# Patient Record
Sex: MALE | Race: WHITE | NOT HISPANIC OR LATINO
[De-identification: names, ages, dates, MRNs, and addresses within clinical notes are randomized per-mention and may not be internally consistent; named-entity substitution may affect disease eponyms.]

---

## 2021-01-28 PROBLEM — Z00.00 ENCOUNTER FOR PREVENTIVE HEALTH EXAMINATION: Status: ACTIVE | Noted: 2021-01-28

## 2021-01-29 ENCOUNTER — APPOINTMENT (OUTPATIENT)
Dept: ORTHOPEDIC SURGERY | Facility: CLINIC | Age: 36
End: 2021-01-29
Payer: COMMERCIAL

## 2021-01-29 VITALS — WEIGHT: 185 LBS | BODY MASS INDEX: 25.9 KG/M2 | HEIGHT: 71 IN

## 2021-01-29 DIAGNOSIS — M76.51 PATELLAR TENDINITIS, RIGHT KNEE: ICD-10-CM

## 2021-01-29 DIAGNOSIS — Z80.9 FAMILY HISTORY OF MALIGNANT NEOPLASM, UNSPECIFIED: ICD-10-CM

## 2021-01-29 DIAGNOSIS — Z87.891 PERSONAL HISTORY OF NICOTINE DEPENDENCE: ICD-10-CM

## 2021-01-29 DIAGNOSIS — Z87.09 PERSONAL HISTORY OF OTHER DISEASES OF THE RESPIRATORY SYSTEM: ICD-10-CM

## 2021-01-29 PROCEDURE — 99072 ADDL SUPL MATRL&STAF TM PHE: CPT

## 2021-01-29 PROCEDURE — 99204 OFFICE O/P NEW MOD 45 MIN: CPT

## 2021-01-29 RX ORDER — ALBUTEROL SULFATE 90 UG/1
INHALANT RESPIRATORY (INHALATION)
Refills: 0 | Status: ACTIVE | COMMUNITY

## 2021-01-29 RX ORDER — DICLOFENAC SODIUM 1% 10 MG/G
1 GEL TOPICAL
Qty: 1 | Refills: 0 | Status: ACTIVE | COMMUNITY
Start: 2021-01-29 | End: 1900-01-01

## 2021-01-29 RX ORDER — OMEPRAZOLE 20 MG/1
20 TABLET, DELAYED RELEASE ORAL
Refills: 0 | Status: ACTIVE | COMMUNITY

## 2021-01-29 NOTE — DISCUSSION/SUMMARY
[de-identified] : Discussed findings of today's exam and possible causes of patient's pain.  Educated patient on their most probable diagnosis of right insertional patellar tendinitis, exacerbation of prior existing Osgood-schlatter's disease with calcific tendinitis.  Reviewed possible courses of treatment, and we collaboratively decided best course of treatment at this time will include conservative management.  Patient was seen by his PCP for this issue, he was started on Mobic, he is planning on picking up the prescription later today.  I recommend he take this medication once a day with food consistently for the next 1 week, then as needed thereafter.  Patient also started on a course of topical NSAIDs as this issue is a very superficial inflammatory problem, prescription given for diclofenac gel 1% to be applied to the area of pain 2-3 times daily as needed (We discussed all possible side effects of this medication).  Patient will be started on a course of physical therapy to restore normal range of motion and strength as tolerated.  Patient will be excused from his work as an [employer]  for 1 week, he should be able to return to work full duty at that time.  Patient advised he can  an over-the-counter patellar tendon strap to be worn for support during the day with activity and with his return to work.  Follow up as needed.  Patient appreciates and agrees with current plan.\par \par This note was generated using dragon medical dictation software.  A reasonable effort has been made for proofreading its contents, but typos may still remain.  If there are any questions or points of clarification needed please notify my office.

## 2021-01-29 NOTE — RETURN TO WORK/SCHOOL
[FreeTextEntry1] : Ronen was seen today for evaluation and management of right knee pain.  Please excuse him from work until Friday, 2/5/2021 at which time he is cleared for full work duty without restrictions.\par Thank you for your understanding.\par \par Sincerely,\par \par oJse C Baptiste DO, ATC\par Primary Care Sports Medicine\par [facility]\par

## 2021-01-29 NOTE — PHYSICAL EXAM
[de-identified] : Constitutional: Well-nourished, well-developed, No acute distress\par Respiratory:  Good respiratory effort, no SOB\par Lymphatic: No regional lymphadenopathy, no lymphedema\par Psychiatric: Pleasant and normal affect, alert and oriented x3\par Skin: Clean dry and intact B/L UE/LE\par Musculoskeletal: normal except where as noted in regional exam\par \par \par Left knee:\par APPEARANCE: no marked deformities, no swelling or malalignment\par POSITIVE TENDERNESS:  none\par NONTENDER: jt lines b/l & retinacula b/l, patellar & quadriceps tendons, MCL/LCL, ITB at the lateral femoral condyle & Gerdy's tubercle, pes bursa. \par ROM: full & painless. \par RESISTIVE TESTING: painless resisted knee flex/ext. \par SPECIAL TESTS: stable v/v stress. painless grind. neg Lachman's. neg ant/post drawer. neg Tej's. neg Thessaly test. neg Rosy's & Malacrae's\par NEURO: Normal sensation of LE, DTRs 2+/4 patella and achilles\par PULSES: 2+ DP/PT pulses\par \par Right knee:\par APPEARANCE: no marked deformities, no swelling or malalignment\par POSITIVE TENDERNESS:  + crepitus of the anterior knee, and tenderness of patellar tendon\par NONTENDER: jt lines b/l, quadriceps tendons, MCL/LCL, ITB at the lateral femoral condyle & Gerdy's tubercle, pes bursa. \par ROM: full & painless, although some discomfort in deep knee flexion\par RESISTIVE TESTING: + discomfort with knee ext from deep knee flexion (stretched position), painless knee flexion. \par SPECIAL TESTS: stable v/v stress. painless grind. neg Lachman's. neg ant/post drawer. neg Tej's. neg Thessaly test. neg Rosy's & Malacrae's\par NEURO: Normal sensation of LE, DTRs 2+/4 patella and achilles\par PULSES: 2+ DP/PT pulses [de-identified] : I reviewed and clinically correlated the following outside imaging studies,\par My review of the patient's images shows he has prior existing Osgood-Schlatter's disease with a small calcium deposit in the distal patellar tendon\par \par  RIGHT KNEE XRAY AP LATERAL AND OBLIQUE 3 VIEWS\par HISTORY: M25.561 Right knee pain M25.361 Right knee instability\par AP, lateral, and oblique views of the right knee are obtained.\par There is no fracture, subluxation or dislocation. No osteoblastic or osteolytic lesions can be identified.\par The joint spaces are preserved. The subarticular cortex is smooth. No joint effusion is demonstrated.\par IMPRESSION: There are no significant degenerative changes.\par There are no fractures.

## 2021-01-29 NOTE — CONSULT LETTER
[Dear  ___] : Dear  [unfilled], [Consult Letter:] : I had the pleasure of evaluating your patient, [unfilled]. [Please see my note below.] : Please see my note below. [Sincerely,] : Sincerely, [FreeTextEntry2] : [id number], Joan, 4231\par [place] Nina [FreeTextEntry3] : Jose C Baptiste DO, ATC\par Primary Care Sports Medicine\par [facility]

## 2021-01-29 NOTE — HISTORY OF PRESENT ILLNESS
[de-identified] : Patient is here for right knee pain that began on 1/23/21 after using the elliptical. He had increased sharp pain on 1/26/21 after bending his knee. He has ahistory of right sided imbalance. He has been treated with PT. His pain is located anteriorly. He saw his PCP who sent him for xrays which were negative for fracture. He was prescribed Mobic.  He uses a compression wrap. Denies N/T/R/Prior injury. He works in [employer]. \par \par The patient's past medical history, past surgical history, medications and allergies were reviewed by me today and documented accordingly. In addition, the patient's family and social history, which were noncontributory to this visit, were reviewed also. Intake form was reviewed. The patient has no family history of arthritis.

## 2021-07-18 ENCOUNTER — TRANSCRIPTION ENCOUNTER (OUTPATIENT)
Age: 36
End: 2021-07-18

## 2021-07-18 ENCOUNTER — INPATIENT (INPATIENT)
Facility: HOSPITAL | Age: 36
LOS: 0 days | Discharge: HOME | DRG: 343 | End: 2021-07-19
Attending: SURGERY | Admitting: SURGERY
Payer: COMMERCIAL

## 2021-07-18 VITALS
OXYGEN SATURATION: 99 % | WEIGHT: 190.04 LBS | RESPIRATION RATE: 16 BRPM | DIASTOLIC BLOOD PRESSURE: 93 MMHG | TEMPERATURE: 99 F | SYSTOLIC BLOOD PRESSURE: 143 MMHG | HEART RATE: 89 BPM | HEIGHT: 71 IN

## 2021-07-18 LAB
ALBUMIN SERPL ELPH-MCNC: 4.6 G/DL — SIGNIFICANT CHANGE UP (ref 3.3–5)
ALP SERPL-CCNC: 92 U/L — SIGNIFICANT CHANGE UP (ref 40–120)
ALT FLD-CCNC: 37 U/L — SIGNIFICANT CHANGE UP (ref 12–78)
ANION GAP SERPL CALC-SCNC: 5 MMOL/L — SIGNIFICANT CHANGE UP (ref 5–17)
APPEARANCE UR: CLEAR — SIGNIFICANT CHANGE UP
AST SERPL-CCNC: 24 U/L — SIGNIFICANT CHANGE UP (ref 15–37)
BASOPHILS # BLD AUTO: 0.06 K/UL — SIGNIFICANT CHANGE UP (ref 0–0.2)
BASOPHILS NFR BLD AUTO: 0.4 % — SIGNIFICANT CHANGE UP (ref 0–2)
BILIRUB SERPL-MCNC: 0.7 MG/DL — SIGNIFICANT CHANGE UP (ref 0.2–1.2)
BILIRUB UR-MCNC: NEGATIVE — SIGNIFICANT CHANGE UP
BUN SERPL-MCNC: 13 MG/DL — SIGNIFICANT CHANGE UP (ref 7–23)
CALCIUM SERPL-MCNC: 9.6 MG/DL — SIGNIFICANT CHANGE UP (ref 8.5–10.1)
CHLORIDE SERPL-SCNC: 105 MMOL/L — SIGNIFICANT CHANGE UP (ref 96–108)
CO2 SERPL-SCNC: 29 MMOL/L — SIGNIFICANT CHANGE UP (ref 22–31)
COLOR SPEC: YELLOW — SIGNIFICANT CHANGE UP
CREAT SERPL-MCNC: 1 MG/DL — SIGNIFICANT CHANGE UP (ref 0.5–1.3)
DIFF PNL FLD: NEGATIVE — SIGNIFICANT CHANGE UP
EOSINOPHIL # BLD AUTO: 0.17 K/UL — SIGNIFICANT CHANGE UP (ref 0–0.5)
EOSINOPHIL NFR BLD AUTO: 1 % — SIGNIFICANT CHANGE UP (ref 0–6)
GLUCOSE SERPL-MCNC: 130 MG/DL — HIGH (ref 70–99)
GLUCOSE UR QL: NEGATIVE — SIGNIFICANT CHANGE UP
HCT VFR BLD CALC: 44.8 % — SIGNIFICANT CHANGE UP (ref 39–50)
HGB BLD-MCNC: 15.4 G/DL — SIGNIFICANT CHANGE UP (ref 13–17)
IMM GRANULOCYTES NFR BLD AUTO: 0.5 % — SIGNIFICANT CHANGE UP (ref 0–1.5)
KETONES UR-MCNC: NEGATIVE — SIGNIFICANT CHANGE UP
LEUKOCYTE ESTERASE UR-ACNC: NEGATIVE — SIGNIFICANT CHANGE UP
LIDOCAIN IGE QN: 150 U/L — SIGNIFICANT CHANGE UP (ref 73–393)
LYMPHOCYTES # BLD AUTO: 0.99 K/UL — LOW (ref 1–3.3)
LYMPHOCYTES # BLD AUTO: 5.9 % — LOW (ref 13–44)
MCHC RBC-ENTMCNC: 28.8 PG — SIGNIFICANT CHANGE UP (ref 27–34)
MCHC RBC-ENTMCNC: 34.4 GM/DL — SIGNIFICANT CHANGE UP (ref 32–36)
MCV RBC AUTO: 83.7 FL — SIGNIFICANT CHANGE UP (ref 80–100)
MONOCYTES # BLD AUTO: 1.01 K/UL — HIGH (ref 0–0.9)
MONOCYTES NFR BLD AUTO: 6 % — SIGNIFICANT CHANGE UP (ref 2–14)
NEUTROPHILS # BLD AUTO: 14.41 K/UL — HIGH (ref 1.8–7.4)
NEUTROPHILS NFR BLD AUTO: 86.2 % — HIGH (ref 43–77)
NITRITE UR-MCNC: NEGATIVE — SIGNIFICANT CHANGE UP
NRBC # BLD: 0 /100 WBCS — SIGNIFICANT CHANGE UP (ref 0–0)
PH UR: 5 — SIGNIFICANT CHANGE UP (ref 5–8)
PLATELET # BLD AUTO: 249 K/UL — SIGNIFICANT CHANGE UP (ref 150–400)
POTASSIUM SERPL-MCNC: 4.1 MMOL/L — SIGNIFICANT CHANGE UP (ref 3.5–5.3)
POTASSIUM SERPL-SCNC: 4.1 MMOL/L — SIGNIFICANT CHANGE UP (ref 3.5–5.3)
PROT SERPL-MCNC: 8.1 G/DL — SIGNIFICANT CHANGE UP (ref 6–8.3)
PROT UR-MCNC: NEGATIVE — SIGNIFICANT CHANGE UP
RBC # BLD: 5.35 M/UL — SIGNIFICANT CHANGE UP (ref 4.2–5.8)
RBC # FLD: 11.7 % — SIGNIFICANT CHANGE UP (ref 10.3–14.5)
SODIUM SERPL-SCNC: 139 MMOL/L — SIGNIFICANT CHANGE UP (ref 135–145)
SP GR SPEC: 1.02 — SIGNIFICANT CHANGE UP (ref 1.01–1.02)
UROBILINOGEN FLD QL: NEGATIVE — SIGNIFICANT CHANGE UP
WBC # BLD: 16.72 K/UL — HIGH (ref 3.8–10.5)
WBC # FLD AUTO: 16.72 K/UL — HIGH (ref 3.8–10.5)

## 2021-07-18 RX ORDER — KETOROLAC TROMETHAMINE 30 MG/ML
30 SYRINGE (ML) INJECTION ONCE
Refills: 0 | Status: DISCONTINUED | OUTPATIENT
Start: 2021-07-18 | End: 2021-07-18

## 2021-07-18 RX ORDER — ONDANSETRON 8 MG/1
4 TABLET, FILM COATED ORAL ONCE
Refills: 0 | Status: COMPLETED | OUTPATIENT
Start: 2021-07-18 | End: 2021-07-18

## 2021-07-18 RX ORDER — SODIUM CHLORIDE 9 MG/ML
1000 INJECTION INTRAMUSCULAR; INTRAVENOUS; SUBCUTANEOUS ONCE
Refills: 0 | Status: COMPLETED | OUTPATIENT
Start: 2021-07-18 | End: 2021-07-18

## 2021-07-18 RX ADMIN — Medication 30 MILLIGRAM(S): at 23:27

## 2021-07-18 RX ADMIN — SODIUM CHLORIDE 1000 MILLILITER(S): 9 INJECTION INTRAMUSCULAR; INTRAVENOUS; SUBCUTANEOUS at 23:23

## 2021-07-18 RX ADMIN — ONDANSETRON 4 MILLIGRAM(S): 8 TABLET, FILM COATED ORAL at 23:30

## 2021-07-18 NOTE — ED PROVIDER NOTE - PHYSICAL EXAMINATION
Constitutional: Awake, Alert, non-toxic. NAD. Well appearing, well nourished.   HEAD: Normocephalic, atraumatic.   EYES: EOM intact, conjunctiva and sclera are clear bilaterally.   ENT: No rhinorrhea, patent, mucous membranes pink/moist, no drooling or stridor.   NECK: Supple, non-tender  CARDIOVASCULAR: Normal S1, S2; regular rate and rhythm.  RESPIRATORY: Normal respiratory effort; breath sounds CTAB, no wheezes, rhonchi, or rales. Speaking in full sentences. No accessory muscle use.   ABDOMEN: Soft; (+) RLQ TTP, no guarding or rebound TTP, no CVAT   EXTREMITIES: Full passive and active ROM in all extremities; non-tender to palpation; distal pulses palpable and symmetric  SKIN: Warm, dry; good skin turgor, no apparent lesions or rashes, no ecchymosis, brisk capillary refill.  NEURO: A&O x3. Sensory and motor functions are grossly intact. Speech is normal. Appearance and judgement seem appropriate for gender and age.

## 2021-07-18 NOTE — ED PROVIDER NOTE - ATTENDING CONTRIBUTION TO CARE
34yo male with RLQ pain today, with few episodes of vomiting, chills, temp to 99.5 and no appetite  exam:+RLQ tenderness, +rovsings, +mcburneys, no rebound or guarding  plan: labs, CT r/o AP  agree with assessment and plan of PA

## 2021-07-18 NOTE — ED PROVIDER NOTE - CLINICAL SUMMARY MEDICAL DECISION MAKING FREE TEXT BOX
c/o right sided lower abdominal pain starting today. pt reports he vomited twice. PSHx Hernia repair. plan includes labs, UA r/o UTI, CT abd/pelvis r/o Appendicitis, pain control, re-assess

## 2021-07-18 NOTE — ED PROVIDER NOTE - OBJECTIVE STATEMENT
34 y/o male without reported PMHx presents today c/o right sided lower abdominal pain starting today. pt describes pain as aching, non-radiating, and currently 8/10. pt reports he vomited twice. PSHx Hernia repair. pt denies dysuria, hematuria, vomiting, fever, cp, sob, or any other complaints.

## 2021-07-18 NOTE — ED ADULT TRIAGE NOTE - CHIEF COMPLAINT QUOTE
Patient complaining of pain to right lower abdominal pain started this afternoon with nausea and vomiting denies taking any pain medication

## 2021-07-19 ENCOUNTER — RESULT REVIEW (OUTPATIENT)
Age: 36
End: 2021-07-19

## 2021-07-19 VITALS
RESPIRATION RATE: 13 BRPM | OXYGEN SATURATION: 97 % | SYSTOLIC BLOOD PRESSURE: 128 MMHG | DIASTOLIC BLOOD PRESSURE: 73 MMHG | HEART RATE: 84 BPM

## 2021-07-19 DIAGNOSIS — K35.80 UNSPECIFIED ACUTE APPENDICITIS: ICD-10-CM

## 2021-07-19 DIAGNOSIS — Z98.890 OTHER SPECIFIED POSTPROCEDURAL STATES: Chronic | ICD-10-CM

## 2021-07-19 DIAGNOSIS — Z90.89 ACQUIRED ABSENCE OF OTHER ORGANS: Chronic | ICD-10-CM

## 2021-07-19 LAB
APTT BLD: 33.6 SEC — SIGNIFICANT CHANGE UP (ref 27.5–35.5)
INR BLD: 1.09 RATIO — SIGNIFICANT CHANGE UP (ref 0.88–1.16)
PROTHROM AB SERPL-ACNC: 12.7 SEC — SIGNIFICANT CHANGE UP (ref 10.6–13.6)
SARS-COV-2 RNA SPEC QL NAA+PROBE: SIGNIFICANT CHANGE UP

## 2021-07-19 PROCEDURE — 99285 EMERGENCY DEPT VISIT HI MDM: CPT | Mod: 25

## 2021-07-19 PROCEDURE — 74177 CT ABD & PELVIS W/CONTRAST: CPT | Mod: 26,MG

## 2021-07-19 PROCEDURE — 83690 ASSAY OF LIPASE: CPT

## 2021-07-19 PROCEDURE — 87635 SARS-COV-2 COVID-19 AMP PRB: CPT

## 2021-07-19 PROCEDURE — 81003 URINALYSIS AUTO W/O SCOPE: CPT

## 2021-07-19 PROCEDURE — 86850 RBC ANTIBODY SCREEN: CPT

## 2021-07-19 PROCEDURE — 86901 BLOOD TYPING SEROLOGIC RH(D): CPT

## 2021-07-19 PROCEDURE — 99223 1ST HOSP IP/OBS HIGH 75: CPT | Mod: 57

## 2021-07-19 PROCEDURE — 74177 CT ABD & PELVIS W/CONTRAST: CPT | Mod: MG

## 2021-07-19 PROCEDURE — 88304 TISSUE EXAM BY PATHOLOGIST: CPT

## 2021-07-19 PROCEDURE — 96374 THER/PROPH/DIAG INJ IV PUSH: CPT

## 2021-07-19 PROCEDURE — 85730 THROMBOPLASTIN TIME PARTIAL: CPT

## 2021-07-19 PROCEDURE — 88304 TISSUE EXAM BY PATHOLOGIST: CPT | Mod: 26

## 2021-07-19 PROCEDURE — C1889: CPT

## 2021-07-19 PROCEDURE — G1004: CPT

## 2021-07-19 PROCEDURE — 86900 BLOOD TYPING SEROLOGIC ABO: CPT

## 2021-07-19 PROCEDURE — 85025 COMPLETE CBC W/AUTO DIFF WBC: CPT

## 2021-07-19 PROCEDURE — 36415 COLL VENOUS BLD VENIPUNCTURE: CPT

## 2021-07-19 PROCEDURE — 44970 LAPAROSCOPY APPENDECTOMY: CPT | Mod: AS

## 2021-07-19 PROCEDURE — 96375 TX/PRO/DX INJ NEW DRUG ADDON: CPT

## 2021-07-19 PROCEDURE — 44970 LAPAROSCOPY APPENDECTOMY: CPT

## 2021-07-19 PROCEDURE — 80053 COMPREHEN METABOLIC PANEL: CPT

## 2021-07-19 PROCEDURE — 85610 PROTHROMBIN TIME: CPT

## 2021-07-19 RX ORDER — OXYCODONE HYDROCHLORIDE 5 MG/1
5 TABLET ORAL ONCE
Refills: 0 | Status: DISCONTINUED | OUTPATIENT
Start: 2021-07-19 | End: 2021-07-19

## 2021-07-19 RX ORDER — HYDROMORPHONE HYDROCHLORIDE 2 MG/ML
0.5 INJECTION INTRAMUSCULAR; INTRAVENOUS; SUBCUTANEOUS
Refills: 0 | Status: DISCONTINUED | OUTPATIENT
Start: 2021-07-19 | End: 2021-07-19

## 2021-07-19 RX ORDER — HYDROMORPHONE HYDROCHLORIDE 2 MG/ML
1 INJECTION INTRAMUSCULAR; INTRAVENOUS; SUBCUTANEOUS EVERY 4 HOURS
Refills: 0 | Status: DISCONTINUED | OUTPATIENT
Start: 2021-07-19 | End: 2021-07-19

## 2021-07-19 RX ORDER — SODIUM CHLORIDE 9 MG/ML
1000 INJECTION INTRAMUSCULAR; INTRAVENOUS; SUBCUTANEOUS
Refills: 0 | Status: DISCONTINUED | OUTPATIENT
Start: 2021-07-19 | End: 2021-07-19

## 2021-07-19 RX ORDER — PANTOPRAZOLE SODIUM 20 MG/1
40 TABLET, DELAYED RELEASE ORAL DAILY
Refills: 0 | Status: DISCONTINUED | OUTPATIENT
Start: 2021-07-19 | End: 2021-07-19

## 2021-07-19 RX ORDER — CEFOTETAN DISODIUM 1 G
2 VIAL (EA) INJECTION ONCE
Refills: 0 | Status: COMPLETED | OUTPATIENT
Start: 2021-07-19 | End: 2021-07-19

## 2021-07-19 RX ORDER — ONDANSETRON 8 MG/1
4 TABLET, FILM COATED ORAL EVERY 6 HOURS
Refills: 0 | Status: DISCONTINUED | OUTPATIENT
Start: 2021-07-19 | End: 2021-07-19

## 2021-07-19 RX ORDER — SODIUM CHLORIDE 9 MG/ML
1000 INJECTION, SOLUTION INTRAVENOUS
Refills: 0 | Status: DISCONTINUED | OUTPATIENT
Start: 2021-07-19 | End: 2021-07-19

## 2021-07-19 RX ORDER — ONDANSETRON 8 MG/1
4 TABLET, FILM COATED ORAL ONCE
Refills: 0 | Status: DISCONTINUED | OUTPATIENT
Start: 2021-07-19 | End: 2021-07-19

## 2021-07-19 RX ORDER — HYDROMORPHONE HYDROCHLORIDE 2 MG/ML
0.5 INJECTION INTRAMUSCULAR; INTRAVENOUS; SUBCUTANEOUS EVERY 4 HOURS
Refills: 0 | Status: DISCONTINUED | OUTPATIENT
Start: 2021-07-19 | End: 2021-07-19

## 2021-07-19 RX ORDER — ACETAMINOPHEN 500 MG
1000 TABLET ORAL ONCE
Refills: 0 | Status: COMPLETED | OUTPATIENT
Start: 2021-07-19 | End: 2021-07-19

## 2021-07-19 RX ORDER — PIPERACILLIN AND TAZOBACTAM 4; .5 G/20ML; G/20ML
3.38 INJECTION, POWDER, LYOPHILIZED, FOR SOLUTION INTRAVENOUS ONCE
Refills: 0 | Status: COMPLETED | OUTPATIENT
Start: 2021-07-19 | End: 2021-07-19

## 2021-07-19 RX ORDER — OXYCODONE AND ACETAMINOPHEN 5; 325 MG/1; MG/1
1 TABLET ORAL
Qty: 15 | Refills: 0
Start: 2021-07-19

## 2021-07-19 RX ORDER — OMEPRAZOLE 10 MG/1
0 CAPSULE, DELAYED RELEASE ORAL
Qty: 0 | Refills: 0 | DISCHARGE

## 2021-07-19 RX ADMIN — SODIUM CHLORIDE 1000 MILLILITER(S): 9 INJECTION INTRAMUSCULAR; INTRAVENOUS; SUBCUTANEOUS at 00:59

## 2021-07-19 RX ADMIN — Medication 30 MILLIGRAM(S): at 00:59

## 2021-07-19 RX ADMIN — SODIUM CHLORIDE 125 MILLILITER(S): 9 INJECTION INTRAMUSCULAR; INTRAVENOUS; SUBCUTANEOUS at 04:21

## 2021-07-19 RX ADMIN — HYDROMORPHONE HYDROCHLORIDE 0.5 MILLIGRAM(S): 2 INJECTION INTRAMUSCULAR; INTRAVENOUS; SUBCUTANEOUS at 06:33

## 2021-07-19 RX ADMIN — Medication 400 MILLIGRAM(S): at 04:20

## 2021-07-19 RX ADMIN — Medication 100 GRAM(S): at 09:39

## 2021-07-19 RX ADMIN — PIPERACILLIN AND TAZOBACTAM 200 GRAM(S): 4; .5 INJECTION, POWDER, LYOPHILIZED, FOR SOLUTION INTRAVENOUS at 03:51

## 2021-07-19 RX ADMIN — Medication 1000 MILLIGRAM(S): at 05:23

## 2021-07-19 RX ADMIN — SODIUM CHLORIDE 125 MILLILITER(S): 9 INJECTION INTRAMUSCULAR; INTRAVENOUS; SUBCUTANEOUS at 09:25

## 2021-07-19 RX ADMIN — HYDROMORPHONE HYDROCHLORIDE 0.5 MILLIGRAM(S): 2 INJECTION INTRAMUSCULAR; INTRAVENOUS; SUBCUTANEOUS at 05:23

## 2021-07-19 RX ADMIN — Medication 400 MILLIGRAM(S): at 11:05

## 2021-07-19 RX ADMIN — SODIUM CHLORIDE 75 MILLILITER(S): 9 INJECTION, SOLUTION INTRAVENOUS at 11:05

## 2021-07-19 NOTE — ED ADULT NURSE REASSESSMENT NOTE - NS ED NURSE REASSESS COMMENT FT1
pt c/o pain scale 8 and medicated with dilaudid 0.5mg ivp and ivf infusing well  pt last meal yesterday at 6 pm pt npo at present

## 2021-07-19 NOTE — ASU DISCHARGE PLAN (ADULT/PEDIATRIC) - CARE PROVIDER_API CALL
Roly Acuna)  Surgery  [address] [place] [address]  Phone: [phone]  Fax: [phone]  Follow Up Time:

## 2021-07-19 NOTE — H&P ADULT - NSHPPHYSICALEXAM_GEN_ALL_CORE
PHYSICAL EXAM:  GENERAL: NAD, lying in bed comfortably  HEAD:  Atraumatic, Normocephalic  ABDOMEN: Soft, Nondistended, ttp in RLQ, (+) rovsing's sign  NERVOUS SYSTEM:  Alert & Oriented X3, speech clear. No deficits

## 2021-07-19 NOTE — H&P ADULT - HISTORY OF PRESENT ILLNESS
36 YO Male w/ PMHx of GERD, s/p tonsillectomy (age 8), s/p R lap inguinal hernia repair (2012) p/w RLQ abdominal pain since 2pm. Pain is worsening, constant and 8/10 in severity. Associated with chills, nausea and 1 episode of vomiting. Temperature taken at home of 99.8 F. Denies chest pain, SOB, diarrhea, constipation, hematochezia, or hematemesis. Last ate at 6pm. Received [employer] COVID vaccine 4/2021. Patient currently taking Doxycycline at unknown dose x 7 days for a reaction to an insect bite. 36 YO Male w/ PMHx of GERD, mild asthma, s/p tonsillectomy (age 8), s/p R lap inguinal hernia repair (2012) p/w RLQ abdominal pain since 2pm. Pain is worsening, constant and 8/10 in severity. Associated with chills, nausea and 1 episode of vomiting. Temperature taken at home of 99.8 F. Denies chest pain, SOB, diarrhea, constipation, hematochezia, or hematemesis. Last ate at 6pm. Received [employer] COVID vaccine 4/2021. Patient currently taking Doxycycline at unknown dose x 7 days for a reaction to an insect bite.

## 2021-07-19 NOTE — H&P ADULT - NSHPLABSRESULTS_GEN_ALL_CORE
Vital Signs Last 24 Hrs  T(C): 37.2 (19 Jul 2021 00:31), Max: 37.2 (19 Jul 2021 00:31)  T(F): 98.9 (19 Jul 2021 00:31), Max: 98.9 (19 Jul 2021 00:31)  HR: 78 (19 Jul 2021 00:31) (78 - 89)  BP: 134/87 (19 Jul 2021 00:31) (134/87 - 143/93)  BP(mean): --  RR: 16 (19 Jul 2021 00:31) (16 - 16)  SpO2: 99% (19 Jul 2021 00:31) (99% - 99%)                          15.4   16.72 )-----------( 249      ( 18 Jul 2021 23:16 )             44.8   07-18    139  |  105  |  13  ----------------------------<  130<H>  4.1   |  29  |  1.00    Ca    9.6      18 Jul 2021 23:16    TPro  8.1  /  Alb  4.6  /  TBili  0.7  /  DBili  x   /  AST  24  /  ALT  37  /  AlkPhos  92  07-18    < from: CT Abdomen and Pelvis w/ IV Cont (07.19.21 @ 00:32) >    FINDINGS:    LOWER CHEST: Bibasilar dependent changes. No pleural effusion.    LIVER/GALLBLADDER: Normal size with homogenous enhancement. No biliary ductal dilatation. Unremarkable gallbladder.    PANCREAS: No pancreatic ductal dilatation or peripancreatic fluid collection.    SPLEEN: Normal in size and enhancement.    KIDNEYS: No hydronephrosis.    ADRENAL GLANDS: Unremarkable.    BOWEL: Dilated fluid-filled appendix measures up to 11 mm in diameterwith periappendiceal inflammatory fat stranding due to appendicitis. No perforation, fluid collection, or abscess formation. Reactive thickening of the base of the cecum and terminal ileum is noted. No bowel obstruction or free intraperitoneal air.    URINARY BLADDER: Mild wall thickening due to underdistention versus cystitis.    PELVIC ORGANS: Unremarkable.    LYMPH NODES: No mesenteric or para-aortic lymphadenopathy.    BONES/SOFT TISSUES: Unremarkable.    AORTA/MAJOR BRANCHES: Unremarkable.    IMPRESSION:    Acute uncomplicated appendicitis.

## 2021-07-19 NOTE — H&P ADULT - NSICDXPASTMEDICALHX_GEN_ALL_CORE_FT
PAST MEDICAL HISTORY:  GERD (gastroesophageal reflux disease)      PAST MEDICAL HISTORY:  GERD (gastroesophageal reflux disease)     Mild asthma

## 2021-07-19 NOTE — ED ADULT NURSE REASSESSMENT NOTE - NS ED NURSE REASSESS COMMENT FT1
Received the patient in the Er at the time of change of shift. Patient is alert and oriented. Skin warm and dry. Patient is resting comfortably. Patient will be going to OR.

## 2021-07-19 NOTE — ASU DISCHARGE PLAN (ADULT/PEDIATRIC) - ASU DC SPECIAL INSTRUCTIONSFT
Leave steri strips intact   May remove band aids in AM  May shower in 24 hours- let soapy water run over abdomen, then pat dry  Deep breathing/incentive spirometer  Regular walking  No strenuous activity  No heavy lifting  No pools, hot tubs, lesley

## 2021-07-19 NOTE — H&P ADULT - PROBLEM SELECTOR PLAN 1
36 YO Male w/ PMHx of GERD, s/p tonsillectomy (age 8), s/p R lap inguinal hernia repair (2012) p/w RLQ abdominal pain since 2pm. CT significant for acute uncomplicated appendicitis. Pt with ttp of RLQ & (+) Rovsing's sign, elevated WBC w left shift and is currently afebrile.   - OR for laparoscopic appendectomy  - IV Zosyn  - Keep NPO, IVF  - DVT ppx  - pain control, supportive care, OOB  - Case to be discussed with Dr. Acuna 36 YO Male w/ PMHx of GERD, mild asthma, s/p tonsillectomy (age 8), s/p R lap inguinal hernia repair (2012) p/w RLQ abdominal pain since 2pm. CT significant for acute uncomplicated appendicitis. Pt with ttp of RLQ & (+) Rovsing's sign, elevated WBC w left shift and is currently afebrile.   - OR for laparoscopic appendectomy  - IV Zosyn  - Keep NPO, IVF  - DVT ppx  - pain control, supportive care, OOB  - Case to be discussed with Dr. Acuna

## 2021-07-19 NOTE — H&P ADULT - ATTENDING COMMENTS
Pt seen and examined.  RLQ pain.  Imaging consistent with acute appendicitis.  Surgical history significant only for Right inguinal hernia repair 8 yrs ago.  No evidence of recurrence.    Recommend Laparoscopic Appendectomy.  Risk, Benefits, and Alternatives to surgery have been discussed.  This includes but is not limited to bleeding, infection, damage to adjacent structures, need for additional surgery or interventions, adverse effects of anesthesia such as cardio-respiratory complications, prolonged intubation, cardiac arrhythmia, arrest, and or death.  Risks of forgoing surgery have also been discussed including progression of, and/or worsening of current condition which may then require urgent or emergent treatment or surgery.    OR notified.  Antibiotics on call to OR.  Disposition pending operative findings.  Anticipate uncomplicated appendectomy with discharge to home from PACU.

## 2021-07-20 PROBLEM — K21.9 GASTRO-ESOPHAGEAL REFLUX DISEASE WITHOUT ESOPHAGITIS: Chronic | Status: ACTIVE | Noted: 2021-07-18

## 2021-07-20 PROBLEM — J45.909 UNSPECIFIED ASTHMA, UNCOMPLICATED: Chronic | Status: ACTIVE | Noted: 2021-07-19

## 2021-07-23 ENCOUNTER — NON-APPOINTMENT (OUTPATIENT)
Age: 36
End: 2021-07-23

## 2021-07-28 ENCOUNTER — APPOINTMENT (OUTPATIENT)
Dept: SURGERY | Facility: CLINIC | Age: 36
End: 2021-07-28
Payer: COMMERCIAL

## 2021-07-28 DIAGNOSIS — K35.30 ACUTE APPENDICITIS W/ LOCALIZED PERITONITIS, W/O PERFORATION OR GANGRENE: ICD-10-CM

## 2021-07-28 PROCEDURE — 99024 POSTOP FOLLOW-UP VISIT: CPT

## 2021-07-29 PROBLEM — K35.30 ACUTE APPENDICITIS WITH LOCALIZED PERITONITIS, WITHOUT PERFORATION, ABSCESS, OR GANGRENE: Status: RESOLVED | Noted: 2021-07-29 | Resolved: 2021-07-29

## 2021-07-29 NOTE — HISTORY OF PRESENT ILLNESS
[de-identified] : Recent hospital admission for acute appendicitis.  Returns for routine post op f/u

## 2021-07-29 NOTE — ASSESSMENT
[FreeTextEntry1] : Routine postoperative follow-up status post laparoscopic appendectomy.  Unremarkable postoperative course.  Patient presents with no new complaints.  Incisions are all healing well nicely.  Tolerating regular diet with regular bowel function.\par Pathology reviewed and discussed with the patient.  Findings consistent with acute appendicitis and periappendicitis. \par Postoperative instructions have been provided including avoidance of heavy lifting and strenuous activities in excess of 20-25 pounds for duration of 4-6 weeks. The patient may shower keeping the incisions clean, dry, covered as needed.\par \par Follow-up as needed

## 2021-07-29 NOTE — PHYSICAL EXAM
[Normal Breath Sounds] : Normal breath sounds [Normal Heart Sounds] : normal heart sounds [Normal Rate and Rhythm] : normal rate and rhythm [de-identified] : Healthy young man in no distress [de-identified] : TY VILLATORO EOMI [de-identified] : Soft, nontender nondistended, positive bowel sounds in all four quads.  No hernia or masses. No rebound or guarding.\par Surgical incisions well approximated and healing nicely.  no signs of infection.